# Patient Record
Sex: MALE | Race: WHITE | NOT HISPANIC OR LATINO | Employment: OTHER | URBAN - METROPOLITAN AREA
[De-identification: names, ages, dates, MRNs, and addresses within clinical notes are randomized per-mention and may not be internally consistent; named-entity substitution may affect disease eponyms.]

---

## 2022-02-28 ENCOUNTER — TELEPHONE (OUTPATIENT)
Dept: FAMILY MEDICINE CLINIC | Facility: CLINIC | Age: 52
End: 2022-02-28

## 2022-02-28 NOTE — TELEPHONE ENCOUNTER
Caller: Anil Cook    Relationship to patient: Self    Best call back number:[phone]     Chief complaint: OVER ALL HEALTH CHECK    Type of visit: NEW PATIENT     Additional notes: PATIENT CALLING WANTING TO SCHEDULE A NEW PATIENT APPOINTMENT HE WAS A PREVIOUS PATIENT  HE STATES HE SPOKE TO  AND WAS TOLD TO CALL AND MAKE AN APPOINTMENT NO NEW PATIENT AVAILABLE

## 2022-03-14 ENCOUNTER — OFFICE VISIT (OUTPATIENT)
Dept: FAMILY MEDICINE CLINIC | Facility: CLINIC | Age: 52
End: 2022-03-14

## 2022-03-14 VITALS
WEIGHT: 287 LBS | OXYGEN SATURATION: 98 % | DIASTOLIC BLOOD PRESSURE: 80 MMHG | SYSTOLIC BLOOD PRESSURE: 159 MMHG | RESPIRATION RATE: 18 BRPM | HEART RATE: 74 BPM | BODY MASS INDEX: 41.09 KG/M2 | TEMPERATURE: 96.9 F | HEIGHT: 70 IN

## 2022-03-14 DIAGNOSIS — Z12.5 SCREENING PSA (PROSTATE SPECIFIC ANTIGEN): ICD-10-CM

## 2022-03-14 DIAGNOSIS — I10 PRIMARY HYPERTENSION: Primary | ICD-10-CM

## 2022-03-14 DIAGNOSIS — M76.52 PATELLAR TENDINITIS OF LEFT KNEE: ICD-10-CM

## 2022-03-14 DIAGNOSIS — J30.1 SEASONAL ALLERGIC RHINITIS DUE TO POLLEN: ICD-10-CM

## 2022-03-14 DIAGNOSIS — L29.0 PRURITUS ANI: ICD-10-CM

## 2022-03-14 PROBLEM — N52.9 ERECTILE DYSFUNCTION: Status: ACTIVE | Noted: 2017-11-13

## 2022-03-14 PROBLEM — N52.9 ERECTILE DYSFUNCTION: Status: RESOLVED | Noted: 2017-11-13 | Resolved: 2022-03-14

## 2022-03-14 PROCEDURE — 99204 OFFICE O/P NEW MOD 45 MIN: CPT | Performed by: FAMILY MEDICINE

## 2022-03-14 RX ORDER — FLUTICASONE PROPIONATE 50 MCG
2 SPRAY, SUSPENSION (ML) NASAL DAILY
COMMUNITY

## 2022-03-14 RX ORDER — LISINOPRIL AND HYDROCHLOROTHIAZIDE 20; 12.5 MG/1; MG/1
1 TABLET ORAL DAILY
Qty: 30 TABLET | Refills: 2 | Status: SHIPPED | OUTPATIENT
Start: 2022-03-14 | End: 2022-06-10 | Stop reason: SDUPTHER

## 2022-03-14 RX ORDER — LORATADINE 10 MG/1
1 CAPSULE, LIQUID FILLED ORAL DAILY
COMMUNITY
End: 2023-03-20

## 2022-03-14 NOTE — PATIENT INSTRUCTIONS
Have the xrays and the labs done and call for results.    Start taking the Lisinopril/HCT once a day.    Use the Hydrocortisone ointment in there rectal area.    Get a colonoscopy.    Follow up in the office in 1 month.

## 2022-03-14 NOTE — PROGRESS NOTES
"Subjective   Anil Cook is a 51 y.o. male.     Chief Complaint   Patient presents with   • Knee Pain     Establish care       HPI  Chief complaint: Knee pain, hypertension, rectal itching    Patient is a 51-year-old white male comes in to establish care.    Patient complains of pain in the left knee when he goes up and down stairs and gets in and out of cars.  He states it hurts on the inferior pole of the patella.  He denied injury.  He states is been going on for several weeks.    Patient also complains of rectal itching.  He states this has been going on for several months.  He states that seems to occur when he has seepage.  Patient states that if he can control his seepage it tends to help with the rectal itching.        The following portions of the patient's history were reviewed and updated as appropriate: allergies, current medications, past family history, past medical history, past social history, past surgical history and problem list.    Review of Systems    Objective     /80 (BP Location: Left arm, Patient Position: Sitting, Cuff Size: Large Adult)   Pulse 74   Temp 96.9 °F (36.1 °C) (Infrared)   Resp 18   Ht 177.8 cm (70\")   Wt 130 kg (287 lb)   SpO2 98%   BMI 41.18 kg/m²     Physical Exam  Vitals and nursing note reviewed.   Constitutional:       Appearance: He is well-developed. He is obese.   HENT:      Head: Normocephalic and atraumatic.      Right Ear: Tympanic membrane normal.      Left Ear: Tympanic membrane normal.      Nose: Nose normal.      Mouth/Throat:      Mouth: Mucous membranes are moist.      Pharynx: Oropharynx is clear.   Eyes:      Extraocular Movements: Extraocular movements intact.      Conjunctiva/sclera: Conjunctivae normal.      Pupils: Pupils are equal, round, and reactive to light.   Cardiovascular:      Rate and Rhythm: Normal rate and regular rhythm.      Pulses: Normal pulses.      Heart sounds: Normal heart sounds. No murmur heard.    No friction rub. No " gallop.   Pulmonary:      Effort: Pulmonary effort is normal.      Breath sounds: Normal breath sounds.   Abdominal:      General: Bowel sounds are normal.      Palpations: Abdomen is soft.   Genitourinary:     Prostate: Normal.   Musculoskeletal:         General: Normal range of motion.      Cervical back: Neck supple.   Skin:     General: Skin is warm and dry.   Neurological:      Mental Status: He is alert and oriented to person, place, and time.   Psychiatric:         Behavior: Behavior normal.         Thought Content: Thought content normal.         Judgment: Judgment normal.           Assessment/Plan   Diagnoses and all orders for this visit:    1. Primary hypertension (Primary)-the patient was found to have elevated blood pressure.  He states his blood pressure has been found to be elevated in the past.  I recommended patient start lisinopril hydrochlorothiazide.  Patient is to have laboratory testing.  He is to follow-up in the office in 1 month.  -     CBC & Differential; Future  -     Comprehensive Metabolic Panel; Future  -     Lipid Panel; Future  -     Urinalysis With / Culture If Indicated -; Future  -     ECG 12 Lead; Future    2. Patellar tendinitis of left knee-I recommended x-rays and ice to the area.  -     XR Knee 3 View Left; Future    3. Seasonal allergic rhinitis due to pollen    4.  Pruritus Ani-new-on rectal exam patient has redness and irritation of the skin in the perirectal area and in the intergluteal cleft.  Patient does not have a history of psoriasis.  I recommended a trial of hydrocortisone ointment.  Other orders  -     lisinopril-hydrochlorothiazide (PRINZIDE,ZESTORETIC) 20-12.5 MG per tablet; Take 1 tablet by mouth Daily.  Dispense: 30 tablet; Refill: 2      Patient Instructions   Have the xrays and the labs done and call for results.    Start taking the Lisinopril/HCT once a day.    Use the Hydrocortisone ointment in there rectal area.    Get a colonoscopy.    Follow up in the  office in 1 month.      Vishnu Mauro Jr., MD    03/14/22

## 2022-03-15 ENCOUNTER — PATIENT ROUNDING (BHMG ONLY) (OUTPATIENT)
Dept: FAMILY MEDICINE CLINIC | Facility: CLINIC | Age: 52
End: 2022-03-15

## 2022-03-15 NOTE — PROGRESS NOTES
March 15, 2022    Hello, may I speak with Anil Cook?    My name is Tatyana      I am  with [facility]  [address] [place] [address].    Before we get started may I verify your date of birth? 1970    I am calling to officially welcome you to our practice and ask about your recent visit. Is this a good time to talk? yes    Tell me about your visit with us. What things went well?  Everything was excellent, Dr. Mauro is great!       We're always looking for ways to make our patients' experiences even better. Do you have recommendations on ways we may improve?  no, you all are fantastic    Overall were you satisfied with your first visit to our practice? Yes, absolutely       I appreciate you taking the time to speak with me today. Is there anything else I can do for you? no      Thank you, and have a great day.

## 2022-06-10 RX ORDER — LISINOPRIL AND HYDROCHLOROTHIAZIDE 20; 12.5 MG/1; MG/1
1 TABLET ORAL DAILY
Qty: 30 TABLET | Refills: 1 | Status: SHIPPED | OUTPATIENT
Start: 2022-06-10 | End: 2022-10-20 | Stop reason: SDUPTHER

## 2022-06-10 RX ORDER — FAMOTIDINE 40 MG/1
40 TABLET, FILM COATED ORAL DAILY
COMMUNITY
Start: 2022-05-04

## 2022-07-15 ENCOUNTER — PATIENT ROUNDING (BHMG ONLY) (OUTPATIENT)
Dept: FAMILY MEDICINE CLINIC | Facility: CLINIC | Age: 52
End: 2022-07-15

## 2022-07-15 ENCOUNTER — LAB (OUTPATIENT)
Dept: LAB | Facility: HOSPITAL | Age: 52
End: 2022-07-15

## 2022-07-15 ENCOUNTER — OFFICE VISIT (OUTPATIENT)
Dept: FAMILY MEDICINE CLINIC | Facility: CLINIC | Age: 52
End: 2022-07-15

## 2022-07-15 VITALS
HEIGHT: 70 IN | BODY MASS INDEX: 40.8 KG/M2 | DIASTOLIC BLOOD PRESSURE: 88 MMHG | SYSTOLIC BLOOD PRESSURE: 127 MMHG | OXYGEN SATURATION: 97 % | WEIGHT: 285 LBS | TEMPERATURE: 96.9 F | HEART RATE: 73 BPM

## 2022-07-15 DIAGNOSIS — M79.672 LEFT FOOT PAIN: ICD-10-CM

## 2022-07-15 DIAGNOSIS — I10 PRIMARY HYPERTENSION: Primary | ICD-10-CM

## 2022-07-15 LAB
ANION GAP SERPL CALCULATED.3IONS-SCNC: 12 MMOL/L (ref 5–15)
BUN SERPL-MCNC: 18 MG/DL (ref 6–20)
BUN/CREAT SERPL: 13.3 (ref 7–25)
CALCIUM SPEC-SCNC: 9.1 MG/DL (ref 8.6–10.5)
CHLORIDE SERPL-SCNC: 102 MMOL/L (ref 98–107)
CO2 SERPL-SCNC: 24 MMOL/L (ref 22–29)
CREAT SERPL-MCNC: 1.35 MG/DL (ref 0.76–1.27)
EGFRCR SERPLBLD CKD-EPI 2021: 63.6 ML/MIN/1.73
ERYTHROCYTE [SEDIMENTATION RATE] IN BLOOD: 13 MM/HR (ref 0–20)
GLUCOSE SERPL-MCNC: 88 MG/DL (ref 65–99)
POTASSIUM SERPL-SCNC: 4.1 MMOL/L (ref 3.5–5.2)
SODIUM SERPL-SCNC: 138 MMOL/L (ref 136–145)
URATE SERPL-MCNC: 8.5 MG/DL (ref 3.4–7)

## 2022-07-15 PROCEDURE — 99213 OFFICE O/P EST LOW 20 MIN: CPT | Performed by: FAMILY MEDICINE

## 2022-07-15 PROCEDURE — 36415 COLL VENOUS BLD VENIPUNCTURE: CPT

## 2022-07-15 PROCEDURE — 85652 RBC SED RATE AUTOMATED: CPT

## 2022-07-15 PROCEDURE — 84550 ASSAY OF BLOOD/URIC ACID: CPT

## 2022-07-15 PROCEDURE — 80048 BASIC METABOLIC PNL TOTAL CA: CPT

## 2022-07-15 NOTE — PROGRESS NOTES
July 15, 2022    Hello, may I speak with Anil Cook?    My name is Tatyana      I am  with [facility]  [address] [place] [address].    Before we get started may I verify your date of birth? 1970    I am calling to officially welcome you to our practice and ask about your recent visit. Is this a good time to talk? yes    Tell me about your visit with us. What things went well?  My visit was great. Dr. Mauro is a great doctor.       We're always looking for ways to make our patients' experiences even better. Do you have recommendations on ways we may improve?  no    Overall were you satisfied with your visit to our practice? yes       I appreciate you taking the time to speak with me today. Is there anything else I can do for you? no      Thank you, and have a great day.

## 2022-07-15 NOTE — PATIENT INSTRUCTIONS
Continue your current medications and treatment.    Get the MRI of the foot ankle.    Further care will depend on the test results and how you progress.

## 2022-07-15 NOTE — PROGRESS NOTES
"Subjective   Anil Cook is a 51 y.o. male.     Chief Complaint   Patient presents with   • Foot Pain     L foot/ankle pain       HPI  Chief complaint: Left foot and ankle pain    The patient is a 51-year-old white male states that for the past several weeks he has had pain in the lateral aspect of his left ankle.  He denies specific injury.  He states that he has been working a job that requires him to be on his feet a lot.  He states that when he first wakes up in the morning is not too bad but then as the day goes along the longer he is on his previous pain.  He states he has been wearing an ankle brace.  He went to the urgent care center and had an x-ray which did not reveal any fractures.        The following portions of the patient's history were reviewed and updated as appropriate: allergies, current medications, past family history, past medical history, past social history, past surgical history and problem list.    Review of Systems    Objective     /88 (BP Location: Left arm, Patient Position: Sitting, Cuff Size: Adult)   Pulse 73   Temp 96.9 °F (36.1 °C) (Infrared)   Ht 177.8 cm (70\")   Wt 129 kg (285 lb)   SpO2 97%   BMI 40.89 kg/m²     Physical Exam  Vitals and nursing note reviewed.   Constitutional:       Appearance: He is well-developed.   HENT:      Head: Normocephalic and atraumatic.   Eyes:      Pupils: Pupils are equal, round, and reactive to light.   Cardiovascular:      Rate and Rhythm: Normal rate and regular rhythm.      Heart sounds: Normal heart sounds.   Pulmonary:      Effort: Pulmonary effort is normal.      Breath sounds: Normal breath sounds.   Abdominal:      General: Bowel sounds are normal.      Palpations: Abdomen is soft.   Musculoskeletal:         General: Normal range of motion.      Cervical back: Neck supple.      Comments: Slight swelling of the left ankle  No erythema  No Increased warmth  Tendeness of the lateral aspect of the ankel inferior to the malleolus "   Skin:     General: Skin is warm and dry.   Neurological:      Mental Status: He is oriented to person, place, and time.   Psychiatric:         Behavior: Behavior normal.         Thought Content: Thought content normal.         Judgment: Judgment normal.           Assessment & Plan   Diagnoses and all orders for this visit:    1. Primary hypertension (Primary)    2. Left foot pain the patient has tenderness of the lateral aspect of the talus.  There is a bony projection that appears to be the lateral aspect of the talus that is tender to palpation.  I recommended an MRI.  He is also to limit his weightbearing.      Patient Instructions   Continue your current medications and treatment.    Get the MRI of the foot ankle.    Further care will depend on the test results and how you progress.        Vishnu Mauro Jr., MD    07/15/22

## 2022-07-16 RX ORDER — COLCHICINE 0.6 MG/1
0.6 TABLET ORAL 2 TIMES DAILY
Qty: 14 TABLET | Refills: 1 | Status: SHIPPED | OUTPATIENT
Start: 2022-07-16

## 2022-08-01 ENCOUNTER — HOSPITAL ENCOUNTER (OUTPATIENT)
Dept: MRI IMAGING | Facility: HOSPITAL | Age: 52
Discharge: HOME | End: 2022-08-01
Admitting: FAMILY MEDICINE

## 2022-08-01 ENCOUNTER — DOCUMENTATION (OUTPATIENT)
Dept: FAMILY MEDICINE CLINIC | Facility: CLINIC | Age: 52
End: 2022-08-01

## 2022-08-01 DIAGNOSIS — T14.8XXA BONE BRUISE: Primary | ICD-10-CM

## 2022-08-01 DIAGNOSIS — M79.672 LEFT FOOT PAIN: ICD-10-CM

## 2022-08-01 PROCEDURE — 73718 MRI LOWER EXTREMITY W/O DYE: CPT

## 2022-08-02 ENCOUNTER — TELEPHONE (OUTPATIENT)
Dept: FAMILY MEDICINE CLINIC | Facility: CLINIC | Age: 52
End: 2022-08-02

## 2022-08-02 NOTE — TELEPHONE ENCOUNTER
Ok hub to read:    Called pt and lvm stating we have his work note ready. He is to call back and let me know whether he will pick it up or if it needs to be faxed to his employer. If he wants it faxed, we need the name of the company and fax number.  
no concerns

## 2022-09-12 ENCOUNTER — OFFICE VISIT (OUTPATIENT)
Dept: FAMILY MEDICINE CLINIC | Facility: CLINIC | Age: 52
End: 2022-09-12

## 2022-09-12 VITALS
RESPIRATION RATE: 18 BRPM | BODY MASS INDEX: 39.65 KG/M2 | HEART RATE: 65 BPM | DIASTOLIC BLOOD PRESSURE: 81 MMHG | OXYGEN SATURATION: 97 % | HEIGHT: 70 IN | WEIGHT: 277 LBS | SYSTOLIC BLOOD PRESSURE: 140 MMHG | TEMPERATURE: 97.1 F

## 2022-09-12 DIAGNOSIS — I10 PRIMARY HYPERTENSION: Chronic | ICD-10-CM

## 2022-09-12 DIAGNOSIS — L57.0 ACTINIC KERATOSIS: Primary | ICD-10-CM

## 2022-09-12 PROBLEM — L29.0 PRURITUS ANI: Status: RESOLVED | Noted: 2022-03-14 | Resolved: 2022-09-12

## 2022-09-12 PROBLEM — J30.1 SEASONAL ALLERGIC RHINITIS DUE TO POLLEN: Chronic | Status: ACTIVE | Noted: 2022-03-14

## 2022-09-12 PROCEDURE — 99213 OFFICE O/P EST LOW 20 MIN: CPT | Performed by: FAMILY MEDICINE

## 2022-09-12 NOTE — PATIENT INSTRUCTIONS
Continue your current medications and treatment.    Follow up with dermatology for the skin lesion.

## 2022-09-12 NOTE — PROGRESS NOTES
"Subjective   Anil Cook is a 52 y.o. male.     Chief Complaint   Patient presents with   • Facial Pain     On left side cheek bone sore when brushing facial hair   • Hypertension       HPI  Chief complaint: Facial skin lesion, hypertension    The patient is a 52-year-old white male states that for the past several weeks he has noted a skin lesion in the right preauricular area.  He states that it is sore to touch.  He states he cannot really see anything in this area.  Patient denied injury.  Patient has had frequent sunburns in the past.    His current problems include    1. hypertension-stable-patient on lisinopril 20 mg daily hydrochlorothiazide 12.5 mg daily.  Blood pressure stable.    2.  Gout-stable- is on colchicine 0.6 twice a day as needed.    3.  Allergic rhinitis-stable-patient on Flonase 2 sprays each nostril once a day and Claritin as needed.        The following portions of the patient's history were reviewed and updated as appropriate: allergies, current medications, past family history, past medical history, past social history, past surgical history and problem list.    Review of Systems    Objective     /81 (BP Location: Left arm, Patient Position: Sitting, Cuff Size: Large Adult)   Pulse 65   Temp 97.1 °F (36.2 °C) (Infrared)   Resp 18   Ht 177.8 cm (70\")   Wt 126 kg (277 lb)   SpO2 97%   BMI 39.75 kg/m²     Physical Exam  Vitals and nursing note reviewed.   Constitutional:       Appearance: He is well-developed. He is obese.   HENT:      Head: Normocephalic and atraumatic.   Eyes:      Pupils: Pupils are equal, round, and reactive to light.   Cardiovascular:      Rate and Rhythm: Normal rate and regular rhythm.      Pulses: Normal pulses.      Heart sounds: Normal heart sounds. No murmur heard.    No friction rub. No gallop.   Pulmonary:      Effort: Pulmonary effort is normal.      Breath sounds: Normal breath sounds.   Abdominal:      General: Bowel sounds are normal.      " Palpations: Abdomen is soft.   Musculoskeletal:         General: Normal range of motion.      Cervical back: Neck supple.   Skin:     General: Skin is warm and dry.      Comments: 1.0 cm diameter area of actinic keratosis in the left preauricular area   Neurological:      Mental Status: He is alert and oriented to person, place, and time.   Psychiatric:         Behavior: Behavior normal.         Thought Content: Thought content normal.         Judgment: Judgment normal.           Assessment & Plan   Diagnoses and all orders for this visit:    1. Actinic keratosis (Primary) patient has 1 cm diameter area of what appears to be actinic keratosis in the left preauricular cyst area.  There is no mass.  There is an area of increased erythema and slight scaling.  Was advised that this likely is not a actinic keratoses.  Recommended dermatology to see.    2. Primary hypertension      Patient Instructions   Continue your current medications and treatment.    Follow up with dermatology for the skin lesion.      Vishnu Mauro Jr., MD    09/12/22

## 2022-10-20 ENCOUNTER — TELEPHONE (OUTPATIENT)
Dept: FAMILY MEDICINE CLINIC | Facility: CLINIC | Age: 52
End: 2022-10-20

## 2022-10-20 RX ORDER — LISINOPRIL AND HYDROCHLOROTHIAZIDE 20; 12.5 MG/1; MG/1
1 TABLET ORAL DAILY
Qty: 90 TABLET | Refills: 0 | Status: SHIPPED | OUTPATIENT
Start: 2022-10-20 | End: 2023-03-27 | Stop reason: SDUPTHER

## 2022-10-24 ENCOUNTER — LAB (OUTPATIENT)
Dept: LAB | Facility: HOSPITAL | Age: 52
End: 2022-10-24

## 2022-10-24 ENCOUNTER — OFFICE VISIT (OUTPATIENT)
Dept: FAMILY MEDICINE CLINIC | Facility: CLINIC | Age: 52
End: 2022-10-24

## 2022-10-24 ENCOUNTER — HOSPITAL ENCOUNTER (OUTPATIENT)
Dept: GENERAL RADIOLOGY | Facility: HOSPITAL | Age: 52
Discharge: HOME | End: 2022-10-24

## 2022-10-24 VITALS
HEART RATE: 80 BPM | BODY MASS INDEX: 39.37 KG/M2 | DIASTOLIC BLOOD PRESSURE: 81 MMHG | SYSTOLIC BLOOD PRESSURE: 137 MMHG | OXYGEN SATURATION: 96 % | WEIGHT: 275 LBS | HEIGHT: 70 IN | RESPIRATION RATE: 18 BRPM | TEMPERATURE: 97.1 F

## 2022-10-24 DIAGNOSIS — R07.89 CHEST WALL PAIN: Primary | ICD-10-CM

## 2022-10-24 DIAGNOSIS — R07.89 CHEST WALL PAIN: ICD-10-CM

## 2022-10-24 LAB
BASOPHILS # BLD AUTO: 0.07 10*3/MM3 (ref 0–0.2)
BASOPHILS NFR BLD AUTO: 0.7 % (ref 0–1.5)
CK SERPL-CCNC: 77 U/L (ref 20–200)
D DIMER PPP FEU-MCNC: 0.34 MG/L (FEU) (ref 0–0.59)
DEPRECATED RDW RBC AUTO: 43.5 FL (ref 37–54)
EOSINOPHIL # BLD AUTO: 0.07 10*3/MM3 (ref 0–0.4)
EOSINOPHIL NFR BLD AUTO: 0.7 % (ref 0.3–6.2)
ERYTHROCYTE [DISTWIDTH] IN BLOOD BY AUTOMATED COUNT: 13.3 % (ref 12.3–15.4)
ERYTHROCYTE [SEDIMENTATION RATE] IN BLOOD: 38 MM/HR (ref 0–20)
HCT VFR BLD AUTO: 39.7 % (ref 37.5–51)
HGB BLD-MCNC: 13.6 G/DL (ref 13–17.7)
IMM GRANULOCYTES # BLD AUTO: 0.03 10*3/MM3 (ref 0–0.05)
IMM GRANULOCYTES NFR BLD AUTO: 0.3 % (ref 0–0.5)
LYMPHOCYTES # BLD AUTO: 2.63 10*3/MM3 (ref 0.7–3.1)
LYMPHOCYTES NFR BLD AUTO: 27.7 % (ref 19.6–45.3)
MCH RBC QN AUTO: 30.3 PG (ref 26.6–33)
MCHC RBC AUTO-ENTMCNC: 34.3 G/DL (ref 31.5–35.7)
MCV RBC AUTO: 88.4 FL (ref 79–97)
MONOCYTES # BLD AUTO: 0.8 10*3/MM3 (ref 0.1–0.9)
MONOCYTES NFR BLD AUTO: 8.4 % (ref 5–12)
NEUTROPHILS NFR BLD AUTO: 5.88 10*3/MM3 (ref 1.7–7)
NEUTROPHILS NFR BLD AUTO: 62.2 % (ref 42.7–76)
NRBC BLD AUTO-RTO: 0 /100 WBC (ref 0–0.2)
PLATELET # BLD AUTO: 258 10*3/MM3 (ref 140–450)
PMV BLD AUTO: 10.3 FL (ref 6–12)
RBC # BLD AUTO: 4.49 10*6/MM3 (ref 4.14–5.8)
WBC NRBC COR # BLD: 9.48 10*3/MM3 (ref 3.4–10.8)

## 2022-10-24 PROCEDURE — 85379 FIBRIN DEGRADATION QUANT: CPT

## 2022-10-24 PROCEDURE — 85025 COMPLETE CBC W/AUTO DIFF WBC: CPT

## 2022-10-24 PROCEDURE — 36415 COLL VENOUS BLD VENIPUNCTURE: CPT

## 2022-10-24 PROCEDURE — 99214 OFFICE O/P EST MOD 30 MIN: CPT | Performed by: FAMILY MEDICINE

## 2022-10-24 PROCEDURE — 82550 ASSAY OF CK (CPK): CPT

## 2022-10-24 PROCEDURE — 71046 X-RAY EXAM CHEST 2 VIEWS: CPT

## 2022-10-24 PROCEDURE — 85652 RBC SED RATE AUTOMATED: CPT

## 2022-10-24 RX ORDER — CYCLOBENZAPRINE HCL 10 MG
10 TABLET ORAL 3 TIMES DAILY PRN
Qty: 21 TABLET | Refills: 0 | Status: SHIPPED | OUTPATIENT
Start: 2022-10-24

## 2022-10-24 NOTE — PATIENT INSTRUCTIONS
Have the xrays and the labs done and call for results.    Take the Flexeril as needed.    Continue your other medications and treatment.    Further care will depend on the test results and how you progress.    Follow up in the office in 1 month.

## 2022-10-24 NOTE — PROGRESS NOTES
"Subjective   Anil Cook is a 52 y.o. male.     Chief Complaint   Patient presents with   • Back Pain     Started a month ago       HPI  Chief complaint: Posterior chest wall pain    Patient is a 52-year-old white male who states that for the past 1 month he has had intermittent right posterior chest wall pain.  He states it will come and go at random.  He states he be fine and all of a sudden the pain will grab him.  He states it is severe enough that it brings him to his knees.  He denied specific injury.  He denied fever or chills.  He denied cough.  He does not recall injuring himself although at work he does have to lift heavy things periodically.        The following portions of the patient's history were reviewed and updated as appropriate: allergies, current medications, past family history, past medical history, past social history, past surgical history and problem list.    Review of Systems    Objective     /81 (BP Location: Left arm, Patient Position: Sitting, Cuff Size: Large Adult)   Pulse 80   Temp 97.1 °F (36.2 °C) (Infrared)   Resp 18   Ht 177.8 cm (70\")   Wt 125 kg (275 lb)   SpO2 96%   BMI 39.46 kg/m²     Physical Exam  Vitals and nursing note reviewed.   Constitutional:       Appearance: He is well-developed. He is obese.   HENT:      Head: Normocephalic and atraumatic.   Eyes:      Pupils: Pupils are equal, round, and reactive to light.   Cardiovascular:      Rate and Rhythm: Normal rate and regular rhythm.      Pulses: Normal pulses.      Heart sounds: Normal heart sounds.   Pulmonary:      Effort: Pulmonary effort is normal.      Breath sounds: Normal breath sounds.      Comments: Examination chest wall reveals some tenderness in the right chest wall in the area of the inferior margin of the scapula.  Abdominal:      General: Bowel sounds are normal.      Palpations: Abdomen is soft.   Musculoskeletal:         General: Normal range of motion.      Cervical back: Neck supple. "   Skin:     General: Skin is warm and dry.   Neurological:      Mental Status: He is oriented to person, place, and time.   Psychiatric:         Behavior: Behavior normal.         Thought Content: Thought content normal.         Judgment: Judgment normal.           Assessment & Plan   Diagnoses and all orders for this visit:    1. Chest wall pain (Primary)  -     CBC & Differential; Future  -     Sedimentation Rate; Future  -     CK; Future  -     D-dimer, Quantitative; Future  -     XR Chest 2 View; Future    Other orders  -     cyclobenzaprine (FLEXERIL) 10 MG tablet; Take 1 tablet by mouth 3 (Three) Times a Day As Needed for Muscle Spasms.  Dispense: 21 tablet; Refill: 0      Patient Instructions   Have the xrays and the labs done and call for results.    Take the Flexeril as needed.    Continue your other medications and treatment.    Further care will depend on the test results and how you progress.    Follow up in the office in 1 month.          Vishnu Mauro Jr., MD    10/24/22

## 2022-10-25 DIAGNOSIS — R93.89 ABNORMAL CHEST X-RAY: ICD-10-CM

## 2022-10-25 DIAGNOSIS — R07.89 CHEST WALL PAIN: Primary | ICD-10-CM

## 2022-12-16 ENCOUNTER — OFFICE VISIT (OUTPATIENT)
Dept: FAMILY MEDICINE CLINIC | Facility: CLINIC | Age: 52
End: 2022-12-16

## 2022-12-16 ENCOUNTER — LAB (OUTPATIENT)
Dept: LAB | Facility: HOSPITAL | Age: 52
End: 2022-12-16

## 2022-12-16 ENCOUNTER — HOSPITAL ENCOUNTER (OUTPATIENT)
Dept: GENERAL RADIOLOGY | Facility: HOSPITAL | Age: 52
Discharge: HOME | End: 2022-12-16

## 2022-12-16 VITALS
HEART RATE: 82 BPM | WEIGHT: 274 LBS | RESPIRATION RATE: 20 BRPM | OXYGEN SATURATION: 97 % | SYSTOLIC BLOOD PRESSURE: 150 MMHG | BODY MASS INDEX: 39.22 KG/M2 | TEMPERATURE: 98.4 F | HEIGHT: 70 IN | DIASTOLIC BLOOD PRESSURE: 100 MMHG

## 2022-12-16 DIAGNOSIS — R53.83 OTHER FATIGUE: ICD-10-CM

## 2022-12-16 DIAGNOSIS — I10 PRIMARY HYPERTENSION: Primary | Chronic | ICD-10-CM

## 2022-12-16 DIAGNOSIS — M25.551 HIP PAIN, RIGHT: ICD-10-CM

## 2022-12-16 DIAGNOSIS — J30.1 SEASONAL ALLERGIC RHINITIS DUE TO POLLEN: Chronic | ICD-10-CM

## 2022-12-16 DIAGNOSIS — R05.2 SUBACUTE COUGH: ICD-10-CM

## 2022-12-16 DIAGNOSIS — I10 PRIMARY HYPERTENSION: ICD-10-CM

## 2022-12-16 LAB
ALBUMIN SERPL-MCNC: 4.4 G/DL (ref 3.5–5.2)
ALBUMIN/GLOB SERPL: 1.3 G/DL
ALP SERPL-CCNC: 80 U/L (ref 39–117)
ALT SERPL W P-5'-P-CCNC: 37 U/L (ref 1–41)
ANION GAP SERPL CALCULATED.3IONS-SCNC: 11.2 MMOL/L (ref 5–15)
AST SERPL-CCNC: 28 U/L (ref 1–40)
BASOPHILS # BLD MANUAL: 0.07 10*3/MM3 (ref 0–0.2)
BASOPHILS NFR BLD MANUAL: 1 % (ref 0–1.5)
BILIRUB SERPL-MCNC: 0.3 MG/DL (ref 0–1.2)
BUN SERPL-MCNC: 20 MG/DL (ref 6–20)
BUN/CREAT SERPL: 16 (ref 7–25)
CALCIUM SPEC-SCNC: 9.5 MG/DL (ref 8.6–10.5)
CHLORIDE SERPL-SCNC: 103 MMOL/L (ref 98–107)
CO2 SERPL-SCNC: 22.8 MMOL/L (ref 22–29)
CREAT SERPL-MCNC: 1.25 MG/DL (ref 0.76–1.27)
DEPRECATED RDW RBC AUTO: 45.7 FL (ref 37–54)
EGFRCR SERPLBLD CKD-EPI 2021: 69.3 ML/MIN/1.73
EOSINOPHIL # BLD MANUAL: 0.07 10*3/MM3 (ref 0–0.4)
EOSINOPHIL NFR BLD MANUAL: 1 % (ref 0.3–6.2)
ERYTHROCYTE [DISTWIDTH] IN BLOOD BY AUTOMATED COUNT: 13.5 % (ref 12.3–15.4)
GLOBULIN UR ELPH-MCNC: 3.3 GM/DL
GLUCOSE SERPL-MCNC: 88 MG/DL (ref 65–99)
HCT VFR BLD AUTO: 42.2 % (ref 37.5–51)
HGB BLD-MCNC: 14.1 G/DL (ref 13–17.7)
LYMPHOCYTES # BLD MANUAL: 2.69 10*3/MM3 (ref 0.7–3.1)
LYMPHOCYTES NFR BLD MANUAL: 5.1 % (ref 5–12)
MCH RBC QN AUTO: 30.5 PG (ref 26.6–33)
MCHC RBC AUTO-ENTMCNC: 33.4 G/DL (ref 31.5–35.7)
MCV RBC AUTO: 91.3 FL (ref 79–97)
MONOCYTES # BLD: 0.38 10*3/MM3 (ref 0.1–0.9)
NEUTROPHILS # BLD AUTO: 4.18 10*3/MM3 (ref 1.7–7)
NEUTROPHILS NFR BLD MANUAL: 56.6 % (ref 42.7–76)
PLAT MORPH BLD: NORMAL
PLATELET # BLD AUTO: 259 10*3/MM3 (ref 140–450)
PMV BLD AUTO: 10.9 FL (ref 6–12)
POTASSIUM SERPL-SCNC: 3.9 MMOL/L (ref 3.5–5.2)
PROCALCITONIN SERPL-MCNC: 0.06 NG/ML (ref 0–0.25)
PROT SERPL-MCNC: 7.7 G/DL (ref 6–8.5)
RBC # BLD AUTO: 4.62 10*6/MM3 (ref 4.14–5.8)
RBC MORPH BLD: NORMAL
SODIUM SERPL-SCNC: 137 MMOL/L (ref 136–145)
VARIANT LYMPHS NFR BLD MANUAL: 36.4 % (ref 19.6–45.3)
WBC MORPH BLD: NORMAL
WBC NRBC COR # BLD: 7.39 10*3/MM3 (ref 3.4–10.8)

## 2022-12-16 PROCEDURE — 36415 COLL VENOUS BLD VENIPUNCTURE: CPT

## 2022-12-16 PROCEDURE — 99214 OFFICE O/P EST MOD 30 MIN: CPT | Performed by: FAMILY MEDICINE

## 2022-12-16 PROCEDURE — 73502 X-RAY EXAM HIP UNI 2-3 VIEWS: CPT

## 2022-12-16 PROCEDURE — 84403 ASSAY OF TOTAL TESTOSTERONE: CPT

## 2022-12-16 PROCEDURE — 85027 COMPLETE CBC AUTOMATED: CPT

## 2022-12-16 PROCEDURE — 85007 BL SMEAR W/DIFF WBC COUNT: CPT

## 2022-12-16 PROCEDURE — 84402 ASSAY OF FREE TESTOSTERONE: CPT

## 2022-12-16 PROCEDURE — 84145 PROCALCITONIN (PCT): CPT

## 2022-12-16 PROCEDURE — 80053 COMPREHEN METABOLIC PANEL: CPT

## 2022-12-16 NOTE — PATIENT INSTRUCTIONS
Continue  your current medications and treatment.    Have the labs,hip xray and CT chest done and  call for results.    Follow up in the office in 3 months.

## 2022-12-16 NOTE — PROGRESS NOTES
"Subjective   Anil Cook is a 52 y.o. male.     Chief Complaint   Patient presents with   • Pain     Has pain all over body       HPI  Chief complaint: Right hip pain hypertension cough    The patient is a 52-year-old white male comes in for follow-up and maintenance of his current problems which include    1. right hip pain-new-patient states that for the past 2 weeks she has had pain in the right hip.  He states that it is a sharp stabbing pain.  He states that it hurts when he flexes the right hip or walks on it.  He denied injury.    2.  Hypertension-stable-patient is currently on lisinopril hydrochlorothiazide 20/12.5 once a day.  Patient cannot remember to take his medications regularly.  He was strongly encouraged to take his medication on a regular basis.    3.  Cough-new-patient states he has been bothered by cough for the past several weeks.  Patient had a chest x-ray done in October 2022 which revealed changes suggestive of atypical pneumonia.  He was supposed have a CT of his chest.  He has not done so.  Patient states he is continue to have cough.  Like to have the CT done now.    4.  Fatigue-new-patient states she does not have any energy.  He states that his stamina is down.  Patient states that his sex drive is down.  Like to have his testosterone checked.        The following portions of the patient's history were reviewed and updated as appropriate: allergies, current medications, past family history, past medical history, past social history, past surgical history and problem list.    Review of Systems    Objective     /100   Pulse 82   Temp 98.4 °F (36.9 °C) (Oral)   Resp 20   Ht 177.8 cm (70\")   Wt 124 kg (274 lb)   SpO2 97%   BMI 39.31 kg/m²     Physical Exam  Vitals and nursing note reviewed.   Constitutional:       Appearance: He is well-developed.   HENT:      Head: Normocephalic and atraumatic.   Eyes:      Pupils: Pupils are equal, round, and reactive to light. "   Cardiovascular:      Rate and Rhythm: Normal rate and regular rhythm.      Heart sounds: Normal heart sounds.   Pulmonary:      Effort: Pulmonary effort is normal.      Breath sounds: Normal breath sounds.   Abdominal:      General: Bowel sounds are normal.      Palpations: Abdomen is soft.   Musculoskeletal:         General: Normal range of motion.      Cervical back: Neck supple.   Skin:     General: Skin is warm and dry.   Neurological:      Mental Status: He is oriented to person, place, and time.   Psychiatric:         Behavior: Behavior normal.         Thought Content: Thought content normal.         Judgment: Judgment normal.           Assessment & Plan   Diagnoses and all orders for this visit:    1. Primary hypertension (Primary)-encourage patient to take his medicines regularly.-     CBC With Manual Differential; Future  -     Comprehensive Metabolic Panel; Future    2. Hip pain, right-patient not have pain with internal and external rotation of the hip.  He had negative straight leg raising test.  He had no tenderness.  The pain is in the anterior aspect of the hip.  -     XR Hip With or Without Pelvis 2 - 3 View Right; Future    3. Subacute cough  -     CT Chest With Contrast; Future    4. Other fatigue  -     CBC With Manual Differential; Future  -     Comprehensive Metabolic Panel; Future  -     Procalcitonin; Future  -     Testosterone (Free & Total), LC / MS; Future    5. Seasonal allergic rhinitis due to pollen      Patient Instructions   Continue  your current medications and treatment.    Have the labs,hip xray and CT chest done and  call for results.    Follow up in the office in 3 months.      Vishnu Mauro Jr., MD    12/16/22

## 2022-12-20 LAB
TESTOST FREE SERPL-MCNC: 5.8 PG/ML (ref 7.2–24)
TESTOST SERPL-MCNC: 353.3 NG/DL (ref 264–916)

## 2022-12-21 ENCOUNTER — HOSPITAL ENCOUNTER (OUTPATIENT)
Dept: CT IMAGING | Facility: HOSPITAL | Age: 52
End: 2022-12-21

## 2022-12-21 DIAGNOSIS — M25.551 HIP PAIN, ACUTE, RIGHT: Primary | ICD-10-CM

## 2023-01-03 DIAGNOSIS — Z12.5 SCREENING PSA (PROSTATE SPECIFIC ANTIGEN): Primary | ICD-10-CM

## 2023-01-03 DIAGNOSIS — E29.1 TESTICULAR HYPOFUNCTION: ICD-10-CM

## 2023-03-20 ENCOUNTER — OFFICE VISIT (OUTPATIENT)
Dept: FAMILY MEDICINE CLINIC | Facility: CLINIC | Age: 53
End: 2023-03-20
Payer: COMMERCIAL

## 2023-03-20 VITALS
SYSTOLIC BLOOD PRESSURE: 131 MMHG | TEMPERATURE: 98.1 F | RESPIRATION RATE: 16 BRPM | DIASTOLIC BLOOD PRESSURE: 83 MMHG | BODY MASS INDEX: 40.23 KG/M2 | OXYGEN SATURATION: 96 % | HEIGHT: 70 IN | WEIGHT: 281 LBS | HEART RATE: 87 BPM

## 2023-03-20 DIAGNOSIS — J30.1 SEASONAL ALLERGIC RHINITIS DUE TO POLLEN: Chronic | ICD-10-CM

## 2023-03-20 DIAGNOSIS — E79.0 HYPERURICEMIA: ICD-10-CM

## 2023-03-20 DIAGNOSIS — I10 PRIMARY HYPERTENSION: Primary | Chronic | ICD-10-CM

## 2023-03-20 DIAGNOSIS — E29.1 TESTICULAR HYPOFUNCTION: Chronic | ICD-10-CM

## 2023-03-20 PROBLEM — R07.89 CHEST WALL PAIN: Status: RESOLVED | Noted: 2022-10-24 | Resolved: 2023-03-20

## 2023-03-20 PROBLEM — M76.52 PATELLAR TENDINITIS OF LEFT KNEE: Status: RESOLVED | Noted: 2022-03-14 | Resolved: 2023-03-20

## 2023-03-20 PROCEDURE — 99213 OFFICE O/P EST LOW 20 MIN: CPT | Performed by: FAMILY MEDICINE

## 2023-03-20 RX ORDER — LORATADINE 10 MG/1
CAPSULE, LIQUID FILLED ORAL
COMMUNITY

## 2023-03-20 NOTE — PROGRESS NOTES
"Subjective   Anil Cook is a 52 y.o. male.     Chief Complaint   Patient presents with   • Hypertension     3 mth f/u   • Foot Pain       HPI  Chief complaint: Hypertension hyperuricemia allergic rhinitis testicular hypofunction    Patient is a 52-year-old white male comes in for follow-up and maintenance of his current problems which include    1.  Hypertension-stable-patient on lisinopril 20 mg daily and hydrochlorothiazide 12.5 mg daily.  Blood pressure is stable.    2.  Hyperuricemia-stable-patient is on colchicine 0.6 mg 1-2 times a day as needed.    3.  Allergic rhinitis-stable-patient on Claritin 10 mg daily and Flonase.  States it helps with his symptoms.    4.  Testicular hypofunction-stable-patient is on testosterone replacement therapy through a clinic.  He is not sure the exact dose that he received.  He states that he received a pellet injection.  He is due for follow-up labs.  He states he feels better since he has been on replacement therapy.        The following portions of the patient's history were reviewed and updated as appropriate: allergies, current medications, past family history, past medical history, past social history, past surgical history and problem list.    Review of Systems    Objective     /83 (BP Location: Left arm, Patient Position: Sitting, Cuff Size: Large Adult)   Pulse 87   Temp 98.1 °F (36.7 °C) (Oral)   Resp 16   Ht 177.8 cm (70\")   Wt 127 kg (281 lb)   SpO2 96%   BMI 40.32 kg/m²     Physical Exam  Vitals and nursing note reviewed.   Constitutional:       Appearance: He is well-developed.   HENT:      Head: Normocephalic and atraumatic.   Eyes:      Pupils: Pupils are equal, round, and reactive to light.   Cardiovascular:      Rate and Rhythm: Normal rate and regular rhythm.      Pulses: Normal pulses.      Heart sounds: Normal heart sounds. No murmur heard.    No friction rub. No gallop.   Pulmonary:      Effort: Pulmonary effort is normal.      Breath " sounds: Normal breath sounds.   Abdominal:      General: Bowel sounds are normal.      Palpations: Abdomen is soft.   Musculoskeletal:         General: Normal range of motion.      Cervical back: Neck supple.   Skin:     General: Skin is warm and dry.   Neurological:      Mental Status: He is alert and oriented to person, place, and time.   Psychiatric:         Behavior: Behavior normal.         Thought Content: Thought content normal.         Judgment: Judgment normal.           Assessment & Plan   Diagnoses and all orders for this visit:    1. Primary hypertension (Primary)    2. Seasonal allergic rhinitis due to pollen    3. Testicular hypofunction    4. Hyperuricemia      Patient Instructions   Continue your current medications and treatment.    Follow up in the office in 6 months.    Have copies of your labs sent to me.          Vishnu Mauro Jr., MD    03/20/23

## 2023-03-20 NOTE — PATIENT INSTRUCTIONS
Continue your current medications and treatment.    Follow up in the office in 6 months.    Have copies of your labs sent to me.

## 2023-03-27 ENCOUNTER — TELEPHONE (OUTPATIENT)
Dept: FAMILY MEDICINE CLINIC | Facility: CLINIC | Age: 53
End: 2023-03-27
Payer: COMMERCIAL

## 2023-03-27 RX ORDER — LISINOPRIL AND HYDROCHLOROTHIAZIDE 20; 12.5 MG/1; MG/1
1 TABLET ORAL DAILY
Qty: 90 TABLET | Refills: 0 | Status: SHIPPED | OUTPATIENT
Start: 2023-03-27 | End: 2023-03-29 | Stop reason: SDUPTHER

## 2023-03-29 ENCOUNTER — TELEPHONE (OUTPATIENT)
Dept: FAMILY MEDICINE CLINIC | Facility: CLINIC | Age: 53
End: 2023-03-29
Payer: COMMERCIAL

## 2023-03-29 RX ORDER — LISINOPRIL AND HYDROCHLOROTHIAZIDE 20; 12.5 MG/1; MG/1
1 TABLET ORAL DAILY
Qty: 90 TABLET | Refills: 0 | Status: SHIPPED | OUTPATIENT
Start: 2023-03-29

## 2023-04-08 ENCOUNTER — PATIENT MESSAGE (OUTPATIENT)
Dept: FAMILY MEDICINE CLINIC | Facility: CLINIC | Age: 53
End: 2023-04-08
Payer: COMMERCIAL

## 2023-09-20 ENCOUNTER — OFFICE VISIT (OUTPATIENT)
Dept: FAMILY MEDICINE CLINIC | Facility: CLINIC | Age: 53
End: 2023-09-20
Payer: COMMERCIAL

## 2023-09-20 VITALS
RESPIRATION RATE: 18 BRPM | SYSTOLIC BLOOD PRESSURE: 137 MMHG | BODY MASS INDEX: 35.79 KG/M2 | HEART RATE: 79 BPM | WEIGHT: 250 LBS | HEIGHT: 70 IN | TEMPERATURE: 98.4 F | DIASTOLIC BLOOD PRESSURE: 73 MMHG | OXYGEN SATURATION: 98 %

## 2023-09-20 DIAGNOSIS — E29.1 TESTICULAR HYPOFUNCTION: Chronic | ICD-10-CM

## 2023-09-20 DIAGNOSIS — I10 PRIMARY HYPERTENSION: Primary | Chronic | ICD-10-CM

## 2023-09-20 DIAGNOSIS — E79.0 HYPERURICEMIA: ICD-10-CM

## 2023-09-20 DIAGNOSIS — J30.1 SEASONAL ALLERGIC RHINITIS DUE TO POLLEN: Chronic | ICD-10-CM

## 2023-09-20 PROCEDURE — 99213 OFFICE O/P EST LOW 20 MIN: CPT | Performed by: FAMILY MEDICINE

## 2023-09-20 RX ORDER — TESTOSTERONE 75 MG/1
PELLET SUBCUTANEOUS ONCE
COMMUNITY

## 2023-09-20 NOTE — PROGRESS NOTES
"Subjective   Anil Cook is a 53 y.o. male.     Chief Complaint   Patient presents with    Hypertension     6 mth f/u       HPI chief complaint: Hypertension    The patient is a 53-year-old white male comes in for follow-up and maintenance of his current problems which include    1.  Hypertension-stable-the patient is currently on lisinopril/hydrochlorothiazide 20/12.5 1 tablet daily.  Patient's blood pressure stable.  Patient is lost 30 pounds in the past 6 months.  He goes to the gym on a regular basis.  Blood pressures controlled.    His other problems include testicular hypofunction and gout allergic rhinitis.  These problems are stable.  Patient is having regular labs drawn by his testosterone replacement therapy provider.          The following portions of the patient's history were reviewed and updated as appropriate: allergies, current medications, past family history, past medical history, past social history, past surgical history and problem list.    Review of Systems    Objective     Pulse 79   Temp 98.4 °F (36.9 °C) (Temporal)   Resp 18   Ht 177.8 cm (70\")   Wt 113 kg (250 lb)   SpO2 98%   BMI 35.87 kg/m²     Physical Exam  Vitals and nursing note reviewed.   Constitutional:       Appearance: He is well-developed.   HENT:      Head: Normocephalic and atraumatic.   Eyes:      Pupils: Pupils are equal, round, and reactive to light.   Cardiovascular:      Rate and Rhythm: Normal rate and regular rhythm.      Pulses: Normal pulses.      Heart sounds: Normal heart sounds. No murmur heard.    No friction rub. No gallop.   Pulmonary:      Effort: Pulmonary effort is normal.      Breath sounds: Normal breath sounds.   Abdominal:      General: Bowel sounds are normal.      Palpations: Abdomen is soft.   Musculoskeletal:         General: Normal range of motion.      Cervical back: Neck supple.   Skin:     General: Skin is warm and dry.   Neurological:      Mental Status: He is alert and oriented to " person, place, and time.   Psychiatric:         Behavior: Behavior normal.         Thought Content: Thought content normal.         Judgment: Judgment normal.         Assessment & Plan   Diagnoses and all orders for this visit:    1. Primary hypertension (Primary)    2. Seasonal allergic rhinitis due to pollen    3. Testicular hypofunction    4. Hyperuricemia      Patient Instructions   Continue your current medications and treatments.    Follow up in the office in 6 months.    Laboratory testing at that time.    Vishnu Mauro Jr., MD    09/20/23

## 2023-10-30 RX ORDER — FAMOTIDINE 40 MG/1
40 TABLET, FILM COATED ORAL DAILY
OUTPATIENT
Start: 2023-10-30

## 2023-11-03 RX ORDER — FAMOTIDINE 40 MG/1
40 TABLET, FILM COATED ORAL DAILY
Qty: 90 TABLET | Refills: 3 | Status: SHIPPED | OUTPATIENT
Start: 2023-11-03

## 2024-11-11 RX ORDER — FAMOTIDINE 40 MG/1
40 TABLET, FILM COATED ORAL DAILY
Qty: 90 TABLET | Refills: 3 | OUTPATIENT
Start: 2024-11-11

## 2025-03-24 ENCOUNTER — OFFICE VISIT (OUTPATIENT)
Dept: FAMILY MEDICINE CLINIC | Facility: CLINIC | Age: 55
End: 2025-03-24
Payer: COMMERCIAL

## 2025-03-24 ENCOUNTER — LAB (OUTPATIENT)
Dept: LAB | Facility: HOSPITAL | Age: 55
End: 2025-03-24
Payer: COMMERCIAL

## 2025-03-24 VITALS
HEIGHT: 70 IN | RESPIRATION RATE: 18 BRPM | DIASTOLIC BLOOD PRESSURE: 93 MMHG | SYSTOLIC BLOOD PRESSURE: 154 MMHG | BODY MASS INDEX: 40.6 KG/M2 | TEMPERATURE: 98.6 F | HEART RATE: 93 BPM | WEIGHT: 283.6 LBS | OXYGEN SATURATION: 96 %

## 2025-03-24 DIAGNOSIS — Z13.220 SCREENING FOR LIPID DISORDERS: ICD-10-CM

## 2025-03-24 DIAGNOSIS — E79.0 HYPERURICEMIA: ICD-10-CM

## 2025-03-24 DIAGNOSIS — E66.813 CLASS 3 SEVERE OBESITY DUE TO EXCESS CALORIES WITH SERIOUS COMORBIDITY AND BODY MASS INDEX (BMI) OF 40.0 TO 44.9 IN ADULT: ICD-10-CM

## 2025-03-24 DIAGNOSIS — Z11.59 ENCOUNTER FOR HEPATITIS C SCREENING TEST FOR LOW RISK PATIENT: ICD-10-CM

## 2025-03-24 DIAGNOSIS — I10 PRIMARY HYPERTENSION: ICD-10-CM

## 2025-03-24 DIAGNOSIS — D58.2 ELEVATED HEMOGLOBIN: ICD-10-CM

## 2025-03-24 DIAGNOSIS — Z13.1 SCREENING FOR DIABETES MELLITUS: ICD-10-CM

## 2025-03-24 DIAGNOSIS — Q60.0 SOLITARY KIDNEY, CONGENITAL: ICD-10-CM

## 2025-03-24 DIAGNOSIS — K21.9 GASTROESOPHAGEAL REFLUX DISEASE WITHOUT ESOPHAGITIS: ICD-10-CM

## 2025-03-24 DIAGNOSIS — R06.83 SNORING: ICD-10-CM

## 2025-03-24 DIAGNOSIS — R07.9 CHEST PAIN, UNSPECIFIED TYPE: Primary | ICD-10-CM

## 2025-03-24 DIAGNOSIS — Z80.0 FAMILY HISTORY OF GASTRIC CANCER: ICD-10-CM

## 2025-03-24 DIAGNOSIS — E66.01 CLASS 3 SEVERE OBESITY DUE TO EXCESS CALORIES WITH SERIOUS COMORBIDITY AND BODY MASS INDEX (BMI) OF 40.0 TO 44.9 IN ADULT: ICD-10-CM

## 2025-03-24 DIAGNOSIS — R94.31 ABNORMAL EKG: ICD-10-CM

## 2025-03-24 DIAGNOSIS — J30.1 SEASONAL ALLERGIC RHINITIS DUE TO POLLEN: ICD-10-CM

## 2025-03-24 DIAGNOSIS — E29.1 TESTICULAR HYPOFUNCTION: ICD-10-CM

## 2025-03-24 DIAGNOSIS — Z86.0101 PERSONAL HISTORY OF ADENOMATOUS AND SERRATED COLON POLYPS: ICD-10-CM

## 2025-03-24 LAB
25(OH)D3 SERPL-MCNC: 47 NG/ML (ref 30–100)
ALBUMIN SERPL-MCNC: 3.9 G/DL (ref 3.5–5.2)
ALBUMIN/GLOB SERPL: 1.2 G/DL
ALP SERPL-CCNC: 65 U/L (ref 39–117)
ALT SERPL W P-5'-P-CCNC: 33 U/L (ref 1–41)
ANION GAP SERPL CALCULATED.3IONS-SCNC: 11 MMOL/L (ref 5–15)
AST SERPL-CCNC: 25 U/L (ref 1–40)
BASOPHILS # BLD AUTO: 0.06 10*3/MM3 (ref 0–0.2)
BASOPHILS NFR BLD AUTO: 0.6 % (ref 0–1.5)
BILIRUB SERPL-MCNC: 0.2 MG/DL (ref 0–1.2)
BUN SERPL-MCNC: 12 MG/DL (ref 6–20)
BUN/CREAT SERPL: 8.2 (ref 7–25)
CALCIUM SPEC-SCNC: 9.4 MG/DL (ref 8.6–10.5)
CHLORIDE SERPL-SCNC: 101 MMOL/L (ref 98–107)
CHOLEST SERPL-MCNC: 162 MG/DL (ref 0–200)
CO2 SERPL-SCNC: 25 MMOL/L (ref 22–29)
CREAT SERPL-MCNC: 1.47 MG/DL (ref 0.76–1.27)
DEPRECATED RDW RBC AUTO: 45 FL (ref 37–54)
EGFRCR SERPLBLD CKD-EPI 2021: 56.3 ML/MIN/1.73
EOSINOPHIL # BLD AUTO: 0.17 10*3/MM3 (ref 0–0.4)
EOSINOPHIL NFR BLD AUTO: 1.6 % (ref 0.3–6.2)
ERYTHROCYTE [DISTWIDTH] IN BLOOD BY AUTOMATED COUNT: 13.1 % (ref 12.3–15.4)
GLOBULIN UR ELPH-MCNC: 3.2 GM/DL
GLUCOSE SERPL-MCNC: 91 MG/DL (ref 65–99)
HBA1C MFR BLD: 5.67 % (ref 4.8–5.6)
HCT VFR BLD AUTO: 49.6 % (ref 37.5–51)
HCV AB SER QL: NORMAL
HDLC SERPL-MCNC: 22 MG/DL (ref 40–60)
HGB BLD-MCNC: 16.4 G/DL (ref 13–17.7)
IMM GRANULOCYTES # BLD AUTO: 0.03 10*3/MM3 (ref 0–0.05)
IMM GRANULOCYTES NFR BLD AUTO: 0.3 % (ref 0–0.5)
LDLC SERPL CALC-MCNC: 91 MG/DL (ref 0–100)
LDLC/HDLC SERPL: 3.74 {RATIO}
LYMPHOCYTES # BLD AUTO: 3.04 10*3/MM3 (ref 0.7–3.1)
LYMPHOCYTES NFR BLD AUTO: 29.1 % (ref 19.6–45.3)
MCH RBC QN AUTO: 30.9 PG (ref 26.6–33)
MCHC RBC AUTO-ENTMCNC: 33.1 G/DL (ref 31.5–35.7)
MCV RBC AUTO: 93.4 FL (ref 79–97)
MONOCYTES # BLD AUTO: 1.26 10*3/MM3 (ref 0.1–0.9)
MONOCYTES NFR BLD AUTO: 12.1 % (ref 5–12)
NEUTROPHILS NFR BLD AUTO: 5.88 10*3/MM3 (ref 1.7–7)
NEUTROPHILS NFR BLD AUTO: 56.3 % (ref 42.7–76)
NRBC BLD AUTO-RTO: 0 /100 WBC (ref 0–0.2)
PLATELET # BLD AUTO: 219 10*3/MM3 (ref 140–450)
PMV BLD AUTO: 9.7 FL (ref 6–12)
POTASSIUM SERPL-SCNC: 4.2 MMOL/L (ref 3.5–5.2)
PROT SERPL-MCNC: 7.1 G/DL (ref 6–8.5)
RBC # BLD AUTO: 5.31 10*6/MM3 (ref 4.14–5.8)
SODIUM SERPL-SCNC: 137 MMOL/L (ref 136–145)
TRIGL SERPL-MCNC: 289 MG/DL (ref 0–150)
TSH SERPL DL<=0.05 MIU/L-ACNC: 1.82 UIU/ML (ref 0.27–4.2)
URATE SERPL-MCNC: 6.8 MG/DL (ref 3.4–7)
VLDLC SERPL-MCNC: 49 MG/DL (ref 5–40)
WBC NRBC COR # BLD AUTO: 10.44 10*3/MM3 (ref 3.4–10.8)

## 2025-03-24 PROCEDURE — 80050 GENERAL HEALTH PANEL: CPT

## 2025-03-24 PROCEDURE — 36415 COLL VENOUS BLD VENIPUNCTURE: CPT

## 2025-03-24 PROCEDURE — 86803 HEPATITIS C AB TEST: CPT

## 2025-03-24 PROCEDURE — 82306 VITAMIN D 25 HYDROXY: CPT

## 2025-03-24 PROCEDURE — 80061 LIPID PANEL: CPT

## 2025-03-24 PROCEDURE — 83036 HEMOGLOBIN GLYCOSYLATED A1C: CPT

## 2025-03-24 PROCEDURE — 84550 ASSAY OF BLOOD/URIC ACID: CPT

## 2025-03-24 RX ORDER — OMEGA-3S/DHA/EPA/FISH OIL/D3 300MG-1000
400 CAPSULE ORAL DAILY
COMMUNITY

## 2025-03-24 RX ORDER — FAMOTIDINE 40 MG/1
40 TABLET, FILM COATED ORAL DAILY
Qty: 90 TABLET | Refills: 1 | Status: SHIPPED | OUTPATIENT
Start: 2025-03-24

## 2025-03-24 RX ORDER — LISINOPRIL AND HYDROCHLOROTHIAZIDE 12.5; 2 MG/1; MG/1
1 TABLET ORAL DAILY
Qty: 90 TABLET | Refills: 0 | Status: SHIPPED | OUTPATIENT
Start: 2025-03-24

## 2025-03-24 RX ORDER — METHOCARBAMOL 500 MG/1
1 TABLET, FILM COATED ORAL EVERY 12 HOURS SCHEDULED
COMMUNITY
Start: 2025-01-03

## 2025-03-24 RX ORDER — HYDROCODONE BITARTRATE AND ACETAMINOPHEN 5; 325 MG/1; MG/1
1 TABLET ORAL
COMMUNITY
Start: 2025-01-03 | End: 2025-03-24

## 2025-03-24 NOTE — PATIENT INSTRUCTIONS
Call office for lab results if you have not received a call from our office or [employer] message in 1-2 days.    Follow up with cardiology as previously scheduled.   Please take blood pressure daily, keep record, bring with you to follow up.   Send picture of blood pressure record to me in 2 weeks via [employer].  Follow up with testosterone clinic for tx of low testosterone.   Follow up with GI and sleep medicine.  Restart pepcid 40mg daily and prinizide 20-12.5mg daily.  Otherwise Continue current medications and treatment.

## 2025-03-24 NOTE — PROGRESS NOTES
Subjective        Anil Cook is a 54 y.o. male who presents to [facility].     Chief Complaint   Patient presents with    Chest Pain     Patient went to [facility] ER for Chest Pain on 3/17 and DC on the same day.     Establish Care       History of Present Illness    Patient presents for evaluation of chest pain, ER follow up, and to establish care with new provider.  Previous pcp was Dr. Mauro, was last seen 9/2023.  This is my first time evaluating patient.    Chest pain -Per chart notes, patient presented to emergency department 3/17/2025 after experiencing short episode of substernal chest pain that morning that lasted a few minutes, woke him form sleep, radiated toward left chest and left arm.  No shortness of breath, dizziness, palpitations or nausea with pain.  He called his daughter who is a nurse in the ER, she didn't answer so he drove himself to the hospital.  Troponin was slightly elevated at 20, decreased to 18.  EKG showed T wave inversion and ST changes.  BNP was wnl.  He was discharged home, told to follow-up with cardiology Dr. Cook who is his cousin, follow-up with PCP in 1 week. He has appt with Dr. Cook 3/28/2025.  He states he had a heart murmur as a child.   Hypertension - not currently on medication - previously took prinizide 20-12.5 daily, states he stopped in past because blood pressure improved with prior weight loss.  His daughter checks his blood pressure sometimes, states it is around 150/90.   Testicular hypofunction - followed by [facility]rey Brandon in [facility], receives testosterone replacement.  Hyperuricemia - he can't recall having gout in past, had rx previously for colchicine 0.6mg twice daily if needed.  Allergic rhinitis - stable - takes claritin 10mg daily and flonase 2 sprays daily as needed. He is not followed by allergist.   Congenital solitary kidney - states was told he was born with only 1 kidney,  Creatinine/grf wnl.    Snoring - states he snores when sleeping, has never been tested for sleep apnea, he states he works a lot and feels tired constantly, he does not wake up choking.  BMI is 40.69. HGB was 17.8 in 3/2025.  No prior dx of polycythemia.  GERD - needs rx for pepcid 40mg refilled, reports having remote history of egd.  He states he was out of rx for pepcid when chest pain occurred.  Not strictly following gerd diet. Both his mother and maternal grandfather had gastric cancer. EGD 7/2022 by Dr. Mcpherson showed normal esophagus, chronic gastritis with bx negative for h pylori, normal duodenum.    Patient had colonoscopy 7/2022 by Dr. Mcpherson with findings of multiple ssa/ta colon polyps, external/internal hemorrhoids, repeat colonoscopy in 3 years.     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  He is working for [employer].     Reviewed ER notes, labs, and note from prior PCP.     The following portions of the patient's history were reviewed and updated as appropriate: allergies, current medications, past family history, past medical history, past social history, past surgical history and problem list.    Allergies   Allergen Reactions    Sulfa Antibiotics Hives          Current Outpatient Medications:     Cholecalciferol 10 MCG (400 UNIT) tablet, Take 1 tablet by mouth Daily., Disp: , Rfl:     colchicine 0.6 MG tablet, Take 1 tablet by mouth 2 (Two) Times a Day. (Patient taking differently: Take 1 tablet by mouth 2 (Two) Times a Day As Needed.), Disp: 14 tablet, Rfl: 1    famotidine (PEPCID) 40 MG tablet, Take 1 tablet by mouth Daily., Disp: 90 tablet, Rfl: 1    fluticasone (FLONASE) 50 MCG/ACT nasal spray, Administer 2 sprays into the nostril(s) as directed by provider Daily., Disp: , Rfl:     lisinopril-hydrochlorothiazide (PRINZIDE,ZESTORETIC) 20-12.5 MG per tablet, Take 1 tablet by mouth Daily., Disp: 90 tablet, Rfl: 0    Loratadine 10 MG capsule, Take 1 capsule by mouth Daily., Disp: , Rfl:      "methocarbamol (ROBAXIN) 500 MG tablet, Take 1 tablet by mouth Every 12 (Twelve) Hours., Disp: , Rfl:     multivitamin with minerals tablet tablet, Take 1 tablet by mouth Daily., Disp: , Rfl:     Testosterone (Testopel) 75 MG implant pellet, by Implant route 1 (One) Time., Disp: , Rfl:     Review of Systems   Constitutional:  Positive for fatigue. Negative for fever and unexpected weight change.   HENT:  Negative for dental problem and trouble swallowing.    Respiratory:  Negative for cough, shortness of breath, wheezing and stridor.    Cardiovascular:  Positive for chest pain. Negative for leg swelling.   Gastrointestinal:  Negative for abdominal pain, nausea and vomiting.   Skin:  Negative for color change and pallor.   Neurological:  Negative for dizziness and headaches.   Psychiatric/Behavioral:  Positive for sleep disturbance. The patient is not nervous/anxious.         Objective     /93 (BP Location: Left arm, Patient Position: Sitting, Cuff Size: Adult)   Pulse 93   Temp 98.6 °F (37 °C) (Oral)   Resp 18   Ht 177.8 cm (70\")   Wt 129 kg (283 lb 9.6 oz)   SpO2 96%   BMI 40.69 kg/m²   Class 3 Severe Obesity (BMI >=40). Obesity-related health conditions include the following: hypertension. Obesity is unchanged. BMI is is above average; BMI management plan is completed. We discussed Information on healthy weight added to patient's after visit summary.     Physical Exam  Vitals and nursing note reviewed.   Constitutional:       General: He is not in acute distress.     Appearance: Normal appearance. He is obese. He is not ill-appearing or diaphoretic.   HENT:      Head: Normocephalic and atraumatic.      Right Ear: Tympanic membrane, ear canal and external ear normal.      Left Ear: Tympanic membrane, ear canal and external ear normal.      Nose: Nose normal.      Mouth/Throat:      Mouth: Mucous membranes are moist.   Eyes:      General: No scleral icterus.     Conjunctiva/sclera: Conjunctivae normal. "      Pupils: Pupils are equal, round, and reactive to light.   Neck:      Thyroid: No thyroid mass, thyromegaly or thyroid tenderness.      Trachea: Trachea normal.   Cardiovascular:      Rate and Rhythm: Normal rate and regular rhythm.      Pulses: Normal pulses.      Heart sounds: Normal heart sounds.   Pulmonary:      Effort: Pulmonary effort is normal. No respiratory distress.      Breath sounds: Normal breath sounds and air entry. No wheezing or rales.   Abdominal:      General: Bowel sounds are normal. There is no distension.      Palpations: Abdomen is soft. There is no mass.      Tenderness: There is no abdominal tenderness. There is no right CVA tenderness, left CVA tenderness, guarding or rebound.   Musculoskeletal:         General: Normal range of motion.      Cervical back: Normal range of motion and neck supple. No rigidity.      Right lower leg: No edema.      Left lower leg: No edema.   Lymphadenopathy:      Cervical: No cervical adenopathy.   Skin:     General: Skin is warm and dry.      Capillary Refill: Capillary refill takes less than 2 seconds.      Coloration: Skin is not jaundiced.   Neurological:      General: No focal deficit present.      Mental Status: He is alert and oriented to person, place, and time.      Sensory: Sensation is intact.      Gait: Gait normal.   Psychiatric:         Attention and Perception: Attention normal.         Mood and Affect: Mood and affect normal.         Speech: Speech normal.         Behavior: Behavior normal. Behavior is cooperative.         Thought Content: Thought content normal.         Cognition and Memory: Cognition normal.         Judgment: Judgment normal.         PHQ-2 Depression Screening  Little interest or pleasure in doing things? Not at all   Feeling down, depressed, or hopeless? Not at all   PHQ-2 Total Score 0         Result Review    The following data was reviewed by: WANG Mahan on 03/24/2025:        03/17/2025 01/03/2025          Sodium 135 Low     137   Potassium 4.3 3.9   Chloride 102 101   CO2 28 28   Anion Gap 5 8   Calcium 9.2 9.7   Glucose 75 84   BUN 10 16   Creatinine 1.23 1.4 High       BUN/Creatinine Ratio 8.1 11.4   Albumin 3.9 4.5   Total Protein 7.3 7.8   A/G Ratio 1.1 1.4   Alkaline Phosphatase 57 52   ALT (SGPT) 28 28   AST 34 24   Total Bilirubin 0.3 0.4   Globulin, Total 3 3   EGFR 70      Component 03/17/2025       BNP 21     Component 03/17/2025 03/17/2025 03/17/2025 03/17/2025 01/03/2025 01/03/2025 01/03/2025             PT -- -- -- -- -- 11.5 --   TROPONIN I, HIGH SENSITIVE 18 -- -- 20 High     18 -- 16   SLIDE REVIEW -- NONE -- --        Component 03/17/2025 01/03/2025        WBC 7.6 8.8   RBC 5.51 5.17   HGB 17.8 High     17.1   Hematocrit 50.9 49.1   MCV 92.4 High     95 High       MCH 32.2 33.1 High       MCHC 34.9 34.8   RDW 13.8 14.3   Platelets 225 217   MPV 8.5 7.7       Data reviewed : Radiologic studies   and Cardiology studies      CR Chest 1 Vw Portable (03/17/2025 16:22)  Result: EXAM: CR Chest 1 Vw PortableACCESSION NUMBER: [id number]: 3/17/2025 16:22PROVIDED INDICATION: chest painCOMPARISON: Chest and right rib radiograph dated 01/03/2025.TECHNIQUE: Upright frontal view radiographs of the chest was obtained.FINDINGS: No pneumothorax. No pleural effusions. No focal consolidations. Mild cardiomegaly. No acute osseous abnormalities.IMPRESSION: 1. No acute pulmonary abnormalities. PRELIMINARY RESIDENT REPORT. PLEASE FOLLOW UP WITH THE FULL ATTENDING-APPROVED REPORT.          Assessment & Plan    Diagnoses and all orders for this visit:    1. Chest pain, unspecified type (Primary)    2. Abnormal EKG    3. Primary hypertension  -     Comprehensive Metabolic Panel; Future  -     TSH Rfx On Abnormal To Free T4; Future  -     lisinopril-hydrochlorothiazide (PRINZIDE,ZESTORETIC) 20-12.5 MG per tablet; Take 1 tablet by mouth Daily.  Dispense: 90 tablet; Refill: 0    4. Testicular hypofunction    5.  Hyperuricemia  -     Uric Acid; Future    6. Seasonal allergic rhinitis due to pollen    7. Solitary kidney, congenital  -     Comprehensive Metabolic Panel; Future    8. Snoring  -     Ambulatory Referral to Sleep Medicine    9. Class 3 severe obesity due to excess calories with serious comorbidity and body mass index (BMI) of 40.0 to 44.9 in adult  -     Vitamin D,25-Hydroxy; Future    10. Gastroesophageal reflux disease without esophagitis  -     Ambulatory Referral to Gastroenterology  -     famotidine (PEPCID) 40 MG tablet; Take 1 tablet by mouth Daily.  Dispense: 90 tablet; Refill: 1    11. Family history of gastric cancer  -     Ambulatory Referral to Gastroenterology    12. Personal history of adenomatous and serrated colon polyps  -     Ambulatory Referral to Gastroenterology    13. Elevated hemoglobin  -     CBC & Differential; Future    14. Encounter for hepatitis C screening test for low risk patient  -     Hepatitis C Antibody; Future    15. Screening for lipid disorders  -     Lipid Panel; Future    16. Screening for diabetes mellitus  -     Hemoglobin A1c; Future       Patient Instructions   Call office for lab results if you have not received a call from our office or [employer] message in 1-2 days.    Follow up with cardiology as previously scheduled.   Please take blood pressure daily, keep record, bring with you to follow up.   Send picture of blood pressure record to me in 2 weeks via [employer].  Follow up with testosterone clinic for tx of low testosterone.   Follow up with GI and sleep medicine.  Restart pepcid 40mg daily and prinizide 20-12.5mg daily.  Otherwise Continue current medications and treatment.       I spent 48 minutes caring for Anil on this date of service. This time includes time spent by me in the following activities:preparing for the visit, reviewing tests, obtaining and/or reviewing a separately obtained history, performing a medically appropriate examination and/or evaluation ,  counseling and educating the patient/family/caregiver, ordering medications, tests, or procedures, referring and communicating with other health care professionals , documenting information in the medical record, and care coordination  Follow Up   Return in about 4 weeks (around 4/21/2025) for Recheck, Hypertension, discuss labs.    Patient was given instructions and counseling regarding his condition or for health maintenance advice. Please see specific information pulled into the AVS if appropriate.     Brenda Pugh, WANG     03/26/25

## 2025-03-28 ENCOUNTER — OFFICE VISIT (OUTPATIENT)
Dept: CARDIOLOGY | Facility: CLINIC | Age: 55
End: 2025-03-28
Payer: COMMERCIAL

## 2025-03-28 VITALS
BODY MASS INDEX: 39.94 KG/M2 | HEIGHT: 70 IN | WEIGHT: 279 LBS | HEART RATE: 87 BPM | SYSTOLIC BLOOD PRESSURE: 137 MMHG | DIASTOLIC BLOOD PRESSURE: 83 MMHG

## 2025-03-28 DIAGNOSIS — I10 HYPERTENSION, ESSENTIAL: ICD-10-CM

## 2025-03-28 DIAGNOSIS — R07.2 PRECORDIAL PAIN: Primary | ICD-10-CM

## 2025-03-28 PROCEDURE — 99214 OFFICE O/P EST MOD 30 MIN: CPT | Performed by: INTERNAL MEDICINE

## 2025-03-28 NOTE — PROGRESS NOTES
Chief Complaint  fam hx of cad , Chest Pain, and Hypertension (Pt went  to er 3/17/25 )    Subjective            Anil Cook presents to [facility] CARDIOLOGY  Chest Pain     Hypertension  Associated symptoms include chest pain.       Anil is here for follow-up evaluation management of chest discomfort.  March 17 he woke up from sleep with chest pain, described as substernal pressure radiating to the left arm.  He drove himself to the emergency room and initially had an EKG that was abnormal at [facility] and then was transferred over to [facility] for the remainder of his workup.  His initial high-sensitivity troponin was 20 and then the repeat was 18 which was normal.  His EKG according to the emergency room note says it had lateral T wave inversions but was not significantly changed from a previous tracing.  He has not had recurrence of significant chest pain since.  He does have a family history of CAD.  He stays active and exercises regularly without chest discomfort during exertion.    PMH  Past Medical History:   Diagnosis Date    Allergic     Colon polyp     Erectile dysfunction 2 years ago    Started TRT    GERD (gastroesophageal reflux disease) Unknown    Dealing with it a while    Heart murmur     Hypertension Don’t remember    On medication for it    Hyperthyroidism     Obesity     Been on the weight loss/gain rollercoaster for years    Renal insufficiency Don’t remember    Only have one kidney    Solitary kidney, congenital          SURGICALHX  Past Surgical History:   Procedure Laterality Date    COLONOSCOPY      ENDOSCOPY      EYE SURGERY      Lasik    HAND SURGERY          SOC  Social History     Socioeconomic History    Marital status:    Tobacco Use    Smoking status: Never     Passive exposure: Never    Smokeless tobacco: Never   Vaping Use    Vaping status: Never Used   Substance and Sexual Activity    Alcohol use: Not Currently     Comment: previous social alcohol use, no  alcohol use in 2 years    Drug use: Never    Sexual activity: Not Currently         FAMHX  Family History   Problem Relation Age of Onset    Depression Mother     Cancer Mother         Stomach cancer    Hyperlipidemia Mother         Breast cancer twice then passed from stomach cancer age 77    Diabetes Father     Kidney disease Father     Hyperlipidemia Father     Heart disease Father         Congestive Heart Failure    Depression Father     Hypertension Sister     Hyperlipidemia Sister     Hyperlipidemia Brother     Hypertension Brother     Hypertension Daughter         Atrial tachycardia    Cancer Maternal Grandfather         Stomach cancer    Hyperlipidemia Maternal Grandfather         Stomach cancer age 77          ALLERGY  Allergies   Allergen Reactions    Sulfa Antibiotics Hives        MEDSCURRENT    Current Outpatient Medications:     Cholecalciferol 10 MCG (400 UNIT) tablet, Take 1 tablet by mouth Daily., Disp: , Rfl:     colchicine 0.6 MG tablet, Take 1 tablet by mouth 2 (Two) Times a Day. (Patient taking differently: Take 1 tablet by mouth 2 (Two) Times a Day As Needed.), Disp: 14 tablet, Rfl: 1    famotidine (PEPCID) 40 MG tablet, Take 1 tablet by mouth Daily., Disp: 90 tablet, Rfl: 1    fluticasone (FLONASE) 50 MCG/ACT nasal spray, Administer 2 sprays into the nostril(s) as directed by provider Daily., Disp: , Rfl:     lisinopril-hydrochlorothiazide (PRINZIDE,ZESTORETIC) 20-12.5 MG per tablet, Take 1 tablet by mouth Daily., Disp: 90 tablet, Rfl: 0    Loratadine 10 MG capsule, Take 1 capsule by mouth Daily., Disp: , Rfl:     methocarbamol (ROBAXIN) 500 MG tablet, Take 1 tablet by mouth Every 12 (Twelve) Hours., Disp: , Rfl:     multivitamin with minerals tablet tablet, Take 1 tablet by mouth Daily., Disp: , Rfl:     Testosterone (Testopel) 75 MG implant pellet, by Implant route 1 (One) Time., Disp: , Rfl:       Review of Systems   Constitutional: Negative.   HENT: Negative.     Eyes: Negative.   "  Cardiovascular:  Positive for chest pain.   Respiratory: Negative.     Endocrine: Negative.    Hematologic/Lymphatic: Negative.    Skin: Negative.    Musculoskeletal: Negative.    Gastrointestinal: Negative.    Genitourinary: Negative.    Neurological: Negative.    Psychiatric/Behavioral: Negative.          Objective     /83   Pulse 87   Ht 177.8 cm (70\")   Wt 127 kg (279 lb)   BMI 40.03 kg/m²       General Appearance:   well developed  well nourished  HENT:   oropharynx moist  lips not cyanotic  Neck:  thyroid not enlarged  supple  Respiratory:  no respiratory distress  normal breath sounds  no rales  Cardiovascular:  no jugular venous distention  regular rhythm  apical impulse normal  S1 normal, S2 normal  no S3, no S4   no murmur  no rub, no thrill  carotid pulses normal; no bruit  pedal pulses normal  lower extremity edema: none    Musculoskeletal:  no clubbing of fingers.   normocephalic, head atraumatic  Skin:   warm, dry  Psychiatric:  judgement and insight appropriate  normal mood and affect      Result Review :     The following data was reviewed by: Dallas Cook MD on 03/28/2025:    CMP          3/24/2025    15:29   CMP   Glucose 91    BUN 12    Creatinine 1.47    EGFR 56.3    Sodium 137    Potassium 4.2    Chloride 101    Calcium 9.4    Total Protein 7.1    Albumin 3.9    Globulin 3.2    Total Bilirubin 0.2    Alkaline Phosphatase 65    AST (SGOT) 25    ALT (SGPT) 33    Albumin/Globulin Ratio 1.2    BUN/Creatinine Ratio 8.2    Anion Gap 11.0      CBC          3/24/2025    15:29   CBC   WBC 10.44    RBC 5.31    Hemoglobin 16.4    Hematocrit 49.6    MCV 93.4    MCH 30.9    MCHC 33.1    RDW 13.1    Platelets 219      Lipid Panel          3/24/2025    15:29   Lipid Panel   Total Cholesterol 162    Triglycerides 289    HDL Cholesterol 22    VLDL Cholesterol 49    LDL Cholesterol  91    LDL/HDL Ratio 3.74      TSH          3/24/2025    15:29   TSH   TSH 1.820        Data reviewed : " Emergency room records reviewed, troponins reviewed      Procedures                Assessment and Plan        ASSESSMENT:  Encounter Diagnoses   Name Primary?    Precordial pain Yes    Hypertension, essential          PLAN:    1.  Precordial pain-1 episode March 17 which woke him from sleep.  His emergency room workup showed no high risk findings.  Risk factors include family history and hypertension.  Stress imaging will be scheduled to evaluate for ischemia.  Also recommend a calcium score which will help guide preventative therapy such as prophylactic aspirin and cholesterol-lowering medication.  He is agreeable with this plan.  He request to have these diagnostics completed in [place] where he lives.  2.  We will discuss the diagnostic results when available          Patient was given instructions and counseling regarding his condition or for health maintenance advice. Please see specific information pulled into the AVS if appropriate.             IZZY Cook MD  3/28/2025    14:30 EDT

## 2025-04-21 ENCOUNTER — OFFICE VISIT (OUTPATIENT)
Dept: FAMILY MEDICINE CLINIC | Facility: CLINIC | Age: 55
End: 2025-04-21
Payer: COMMERCIAL

## 2025-04-21 VITALS
OXYGEN SATURATION: 95 % | HEART RATE: 92 BPM | SYSTOLIC BLOOD PRESSURE: 140 MMHG | RESPIRATION RATE: 16 BRPM | BODY MASS INDEX: 40.09 KG/M2 | DIASTOLIC BLOOD PRESSURE: 86 MMHG | TEMPERATURE: 98 F | WEIGHT: 280 LBS | HEIGHT: 70 IN

## 2025-04-21 DIAGNOSIS — I10 PRIMARY HYPERTENSION: Primary | Chronic | ICD-10-CM

## 2025-04-21 DIAGNOSIS — R07.9 CHEST PAIN, UNSPECIFIED TYPE: ICD-10-CM

## 2025-04-21 DIAGNOSIS — E66.813 CLASS 3 SEVERE OBESITY DUE TO EXCESS CALORIES WITH SERIOUS COMORBIDITY AND BODY MASS INDEX (BMI) OF 40.0 TO 44.9 IN ADULT: ICD-10-CM

## 2025-04-21 DIAGNOSIS — E66.01 CLASS 3 SEVERE OBESITY DUE TO EXCESS CALORIES WITH SERIOUS COMORBIDITY AND BODY MASS INDEX (BMI) OF 40.0 TO 44.9 IN ADULT: ICD-10-CM

## 2025-04-21 DIAGNOSIS — R73.03 PREDIABETES: ICD-10-CM

## 2025-04-21 DIAGNOSIS — Q60.0 SOLITARY KIDNEY, CONGENITAL: ICD-10-CM

## 2025-04-21 DIAGNOSIS — R06.83 SNORING: ICD-10-CM

## 2025-04-21 DIAGNOSIS — K21.9 GASTROESOPHAGEAL REFLUX DISEASE WITHOUT ESOPHAGITIS: ICD-10-CM

## 2025-04-21 DIAGNOSIS — N18.31 STAGE 3A CHRONIC KIDNEY DISEASE: ICD-10-CM

## 2025-04-21 DIAGNOSIS — E78.1 HYPERTRIGLYCERIDEMIA: ICD-10-CM

## 2025-04-21 DIAGNOSIS — E79.0 HYPERURICEMIA: ICD-10-CM

## 2025-04-21 PROCEDURE — 99214 OFFICE O/P EST MOD 30 MIN: CPT | Performed by: NURSE PRACTITIONER

## 2025-04-21 NOTE — PATIENT INSTRUCTIONS
Call to set up appointment with gi and with sleep medicine.  Patient declines lipid lowering medication and adjustment of antihypertensive medication.  Please take blood pressure daily, keep record, bring with you to follow up.   Pt declines wt management referral, call clinic if you change your mind.   Have testing performed as ordered by cardiology.

## 2025-04-21 NOTE — PROGRESS NOTES
Subjective        Anil Cook is a 54 y.o. male who presents to [facility].     Chief Complaint   Patient presents with    Chest Pain     4 wk fl up         Symptoms are: recurrent.   Onset was 1 to 5 years.   Symptoms occur: daily.  Symptoms include: joint pain, nasal congestion, fatigue, myalgias and rash.   Pertinent negative symptoms include no abdominal pain, no anorexia, no change in stool, no chest pain, no chills, no cough, no diaphoresis, no fever, no headaches, no joint swelling, no nausea, no neck pain, no numbness, no sore throat, no swollen glands, no dysuria, no vertigo, no visual change, no vomiting and no weakness.   Treatment and/or Medications comments include: Over the counter creams for itching       Patient presents for follow-up of chest pain and hypertension.    Hypertension/chest pain-I evaluated patient in clinic initially 3/24/2025 as ER follow-up of chest pain and hypertension.  He was advised to monitor blood pressure at home, restart lisinopril-hydrochlorothiazide 20-12.5 mg daily, bring record with him to follow-up.  He recently saw his cousin cardiologist Dr. Cook who recommended cardiac calcium score and stress test.  Blood pressure at home has been 130/90.  No further chest pain, denies shortness of breath. He has been walking up 7 flights of stairs recently because the elevator at his home has been broken, no chest pain or shortness of breath with this activity.  Denies ankle swelling or dizziness.  He has started going to the gym every day, is hiring a  to do his meal plan so that he can change his diet.  In past he was able to stop antihypertensive medications with wt loss and diet.  He is contemplating obtaining GLP-1 receptor agonist to assist with weight loss.  He does not believe his insurance covers these medications.  BMI is 40.18.  CKD-noted on labs 3/2025.  Creatinine mildly elevated 1.47, GFR slightly low 56.3.  Patient reports  history of congenital solitary kidney, is not currently followed by nephrology.  Prediabetes-new-hemoglobin A1c was slightly elevated 5.67 on labs 3/2025.  He does not take medication for prediabetes.  Hypertriglyceridemia-labs 3/2025 with elevated triglycerides 289, total cholesterol , LDL WNL 91, HDL low 22.  He is not on lipid-lowering medication. 10 year ascvd risk elevated 11.3%, he declines statin medication for primary prevention of heart attack or stroke.   Hyperuricemia-labs 3/2025 with uric acid WNL 6.8.  Takes colchicine 0.6 mg twice daily as needed.  Snoring - was referred to sleep medicine to [facility] for sleep apnea, hasn't scheduled appointment.  GERD - was not taking pepcid 40mg when chest pain occurred, restarted this when I saw him 3/26/2025.  He was referred to gi but hasn't scheduled and appointment.  Had 1 episode of heartburn since starting pepcid, denies dysphagia.  Prior EGD 7/2022 by Dr. Mcpherson showed normal esophagus, chronic gastritis with bx negative for h pylori.        Patient with gout, testicular hypofunction followed by testosterone clinic, allergic rhinitis.      The 10-year ASCVD risk score (Kadi DK, et al., 2019) is: 11.3%    Values used to calculate the score:      Age: 54 years      Sex: Male      Is Non- : No      Diabetic: No      Tobacco smoker: No      Systolic Blood Pressure: 140 mmHg      Is BP treated: Yes      HDL Cholesterol: 22 mg/dL      Total Cholesterol: 162 mg/dL       The following portions of the patient's history were reviewed and updated as appropriate: allergies, current medications, past family history, past medical history, past social history, past surgical history and problem list.    Allergies   Allergen Reactions    Sulfa Antibiotics Hives          Current Outpatient Medications:     Cholecalciferol 10 MCG (400 UNIT) tablet, Take 1 tablet by mouth Daily., Disp: , Rfl:     colchicine 0.6 MG tablet, Take 1 tablet by mouth 2  "(Two) Times a Day. (Patient taking differently: Take 1 tablet by mouth 2 (Two) Times a Day As Needed.), Disp: 14 tablet, Rfl: 1    famotidine (PEPCID) 40 MG tablet, Take 1 tablet by mouth Daily., Disp: 90 tablet, Rfl: 1    fluticasone (FLONASE) 50 MCG/ACT nasal spray, Administer 2 sprays into the nostril(s) as directed by provider Daily., Disp: , Rfl:     lisinopril-hydrochlorothiazide (PRINZIDE,ZESTORETIC) 20-12.5 MG per tablet, Take 1 tablet by mouth Daily., Disp: 90 tablet, Rfl: 0    Loratadine 10 MG capsule, Take 1 capsule by mouth Daily., Disp: , Rfl:     methocarbamol (ROBAXIN) 500 MG tablet, Take 1 tablet by mouth Every 12 (Twelve) Hours., Disp: , Rfl:     multivitamin with minerals tablet tablet, Take 1 tablet by mouth Daily., Disp: , Rfl:     Testosterone (Testopel) 75 MG implant pellet, by Implant route 1 (One) Time., Disp: , Rfl:     Review of Systems   Constitutional:  Positive for fatigue. Negative for chills, diaphoresis and fever.   HENT:  Positive for congestion. Negative for sore throat.    Respiratory:  Negative for cough.    Cardiovascular:  Negative for chest pain.   Gastrointestinal:  Negative for abdominal pain, anorexia, nausea and vomiting.   Genitourinary:  Negative for dysuria.   Musculoskeletal:  Positive for joint pain and myalgias. Negative for neck pain.   Skin:  Positive for rash.   Neurological:  Negative for vertigo, weakness, numbness and headaches.        Objective     /86 (BP Location: Left arm, Patient Position: Sitting) Comment: manual  Pulse 92   Temp 98 °F (36.7 °C) (Temporal)   Resp 16   Ht 177.8 cm (70\")   Wt 127 kg (280 lb)   SpO2 95%   BMI 40.18 kg/m²         Physical Exam  Vitals and nursing note reviewed.   Constitutional:       General: He is not in acute distress.     Appearance: Normal appearance. He is obese. He is not ill-appearing or diaphoretic.   HENT:      Head: Normocephalic and atraumatic.      Nose: Nose normal.      Mouth/Throat:      Mouth: " Mucous membranes are moist.   Eyes:      General: No scleral icterus.     Conjunctiva/sclera: Conjunctivae normal.   Neck:      Trachea: Trachea normal.   Cardiovascular:      Rate and Rhythm: Normal rate and regular rhythm.      Pulses: Normal pulses.      Heart sounds: Normal heart sounds.   Pulmonary:      Effort: Pulmonary effort is normal. No respiratory distress.      Breath sounds: Normal breath sounds and air entry.   Abdominal:      General: Bowel sounds are normal. There is no distension.      Palpations: Abdomen is soft. There is no mass.      Tenderness: There is no abdominal tenderness. There is no guarding or rebound.   Musculoskeletal:         General: Normal range of motion.      Cervical back: Normal range of motion and neck supple. No rigidity.      Right lower leg: No edema.      Left lower leg: No edema.   Lymphadenopathy:      Cervical: No cervical adenopathy.   Skin:     General: Skin is warm and dry.      Capillary Refill: Capillary refill takes less than 2 seconds.      Coloration: Skin is not jaundiced.   Neurological:      General: No focal deficit present.      Mental Status: He is alert and oriented to person, place, and time.      Sensory: Sensation is intact.      Gait: Gait normal.   Psychiatric:         Attention and Perception: Attention normal.         Mood and Affect: Mood and affect normal.         Speech: Speech normal.         Behavior: Behavior normal. Behavior is cooperative.         Thought Content: Thought content normal.         Cognition and Memory: Cognition normal.         Judgment: Judgment normal.           Result Review    The following data was reviewed by: WANG Mahan on 04/21/2025:  CMP          3/24/2025    15:29   CMP   Glucose 91    BUN 12    Creatinine 1.47    EGFR 56.3    Sodium 137    Potassium 4.2    Chloride 101    Calcium 9.4    Total Protein 7.1    Albumin 3.9    Globulin 3.2    Total Bilirubin 0.2    Alkaline Phosphatase 65    AST (SGOT) 25     ALT (SGPT) 33    Albumin/Globulin Ratio 1.2    BUN/Creatinine Ratio 8.2    Anion Gap 11.0      CBC          3/24/2025    15:29   CBC   WBC 10.44    RBC 5.31    Hemoglobin 16.4    Hematocrit 49.6    MCV 93.4    MCH 30.9    MCHC 33.1    RDW 13.1    Platelets 219      CBC w/diff          3/24/2025    15:29   CBC w/Diff   WBC 10.44    RBC 5.31    Hemoglobin 16.4    Hematocrit 49.6    MCV 93.4    MCH 30.9    MCHC 33.1    RDW 13.1    Platelets 219    Neutrophil Rel % 56.3    Immature Granulocyte Rel % 0.3    Lymphocyte Rel % 29.1    Monocyte Rel % 12.1    Eosinophil Rel % 1.6    Basophil Rel % 0.6      Lipid Panel          3/24/2025    15:29   Lipid Panel   Total Cholesterol 162    Triglycerides 289    HDL Cholesterol 22    VLDL Cholesterol 49    LDL Cholesterol  91    LDL/HDL Ratio 3.74      TSH          3/24/2025    15:29   TSH   TSH 1.820      A1C Last 3 Results          3/24/2025    15:29   HGBA1C Last 3 Results   Hemoglobin A1C 5.67                       Assessment & Plan    Diagnoses and all orders for this visit:    1. Primary hypertension (Primary)    2. Chest pain, unspecified type    3. Stage 3a chronic kidney disease  -     Ambulatory Referral to Nephrology    4. Solitary kidney, congenital  -     Ambulatory Referral to Nephrology    5. Prediabetes    6. Hypertriglyceridemia    7. Hyperuricemia    8. Snoring    9. Gastroesophageal reflux disease without esophagitis    10. Class 3 severe obesity due to excess calories with serious comorbidity and body mass index (BMI) of 40.0 to 44.9 in adult       Patient Instructions   Call to set up appointment with gi and with sleep medicine.  Patient declines lipid lowering medication and adjustment of antihypertensive medication.  Please take blood pressure daily, keep record, bring with you to follow up.   Pt declines wt management referral, call clinic if you change your mind.   Have testing performed as ordered by cardiology.   Recommend low-sodium low-cholesterol  diet, weight loss.      Follow Up   Return in about 3 months (around 7/21/2025) for Annual physical, Recheck, Hypertension.    Patient was given instructions and counseling regarding his condition or for health maintenance advice. Please see specific information pulled into the AVS if appropriate.     Brenda Pugh, WANG     04/21/25

## 2025-06-19 DIAGNOSIS — I10 PRIMARY HYPERTENSION: ICD-10-CM

## 2025-06-19 RX ORDER — LISINOPRIL AND HYDROCHLOROTHIAZIDE 12.5; 2 MG/1; MG/1
1 TABLET ORAL DAILY
Qty: 90 TABLET | Refills: 0 | Status: SHIPPED | OUTPATIENT
Start: 2025-06-19